# Patient Record
Sex: MALE | Race: WHITE
[De-identification: names, ages, dates, MRNs, and addresses within clinical notes are randomized per-mention and may not be internally consistent; named-entity substitution may affect disease eponyms.]

---

## 2017-11-28 ENCOUNTER — HOSPITAL ENCOUNTER (OUTPATIENT)
Dept: HOSPITAL 62 - RAD | Age: 52
End: 2017-11-28
Attending: INTERNAL MEDICINE
Payer: OTHER GOVERNMENT

## 2017-11-28 DIAGNOSIS — R31.29: ICD-10-CM

## 2017-11-28 DIAGNOSIS — I12.9: Primary | ICD-10-CM

## 2017-11-28 DIAGNOSIS — R80.9: ICD-10-CM

## 2017-11-28 DIAGNOSIS — D64.9: ICD-10-CM

## 2017-11-28 DIAGNOSIS — N18.3: ICD-10-CM

## 2017-11-28 PROCEDURE — 93976 VASCULAR STUDY: CPT

## 2017-11-28 PROCEDURE — 76770 US EXAM ABDO BACK WALL COMP: CPT

## 2017-11-28 NOTE — RADIOLOGY REPORT (SQ)
EXAM DESCRIPTION:  U/S RETROPERITON (RENAL/AORTA); U/S LTD DUPLEX ART/SANJANA FLOW



COMPLETED DATE/TIME:  11/28/2017 11:48 am



REASON FOR STUDY:  CKD STAGE 3 N18.3  CHRONIC KIDNEY DISEASE, STAGE 3 (MODERATE)



COMPARISON:  None.



TECHNIQUE:  Realtime and static grayscale images acquired. Selected color Doppler, velocities and spe
ctral images recorded.



LIMITATIONS:  Unable to visualize the renal artery origins directly off the aorta



FINDINGS:  RIGHT KIDNEY:

RENAL ARTERY VELOCITIES: At the hilum, 61 cm/sec.  Segmental artery velocity 32 cm/sec.

RENAL VEIN:  Color doppler flow present, patent.

VELOCITY RATIO: 0.76.  Normal waveforms.

KIDNEY:  Normal size, 10 cm in length   No significant pathology.

LEFT KIDNEY:

RENAL ARTERY VELOCITIES: At the hilum, 49 cm/sec.  Segmental artery velocity 33 cm/sec.

RENAL VEIN:  Color doppler flow present, patent.

VELOCITY RATIO: 0.6. Normal waveforms.

KIDNEY:  Normal size, 11 cm in length   No significant pathology.

BLADDER: Normal.

OTHER: No other significant finding.



IMPRESSION:  NO DOPPLER EVIDENCE OF HEMODYNAMICALLY SIGNIFICANT RENAL ARTERY STENOSIS.



COMMENT:  NORMAL RENAL ARTERY/AORTA VELOCITY RATIO IS LESS THAN OR EQUAL TO 3.5.



TECHNICAL DOCUMENTATION:  JOB ID:  [id number]

 2011 [employer]- All Rights Reserved

## 2017-11-28 NOTE — RADIOLOGY REPORT (SQ)
EXAM DESCRIPTION:  U/S RETROPERITON (RENAL/AORTA); U/S LTD DUPLEX ART/SANJANA FLOW



COMPLETED DATE/TIME:  11/28/2017 11:48 am



REASON FOR STUDY:  CKD STAGE 3 N18.3  CHRONIC KIDNEY DISEASE, STAGE 3 (MODERATE)



COMPARISON:  None.



TECHNIQUE:  Realtime and static grayscale images acquired. Selected color Doppler, velocities and spe
ctral images recorded.



LIMITATIONS:  Unable to visualize the renal artery origins directly off the aorta



FINDINGS:  RIGHT KIDNEY:

RENAL ARTERY VELOCITIES: At the hilum, 61 cm/sec.  Segmental artery velocity 32 cm/sec.

RENAL VEIN:  Color doppler flow present, patent.

VELOCITY RATIO: 0.76.  Normal waveforms.

KIDNEY:  Normal size, 10 cm in length   No significant pathology.

LEFT KIDNEY:

RENAL ARTERY VELOCITIES: At the hilum, 49 cm/sec.  Segmental artery velocity 33 cm/sec.

RENAL VEIN:  Color doppler flow present, patent.

VELOCITY RATIO: 0.6. Normal waveforms.

KIDNEY:  Normal size, 11 cm in length   No significant pathology.

BLADDER: Normal.

OTHER: No other significant finding.



IMPRESSION:  NO DOPPLER EVIDENCE OF HEMODYNAMICALLY SIGNIFICANT RENAL ARTERY STENOSIS.



COMMENT:  NORMAL RENAL ARTERY/AORTA VELOCITY RATIO IS LESS THAN OR EQUAL TO 3.5.



TECHNICAL DOCUMENTATION:  JOB ID:  [id number]

 2011 [employer]- All Rights Reserved

## 2017-12-13 ENCOUNTER — HOSPITAL ENCOUNTER (OUTPATIENT)
Dept: HOSPITAL 62 - RAD | Age: 52
Discharge: HOME | End: 2017-12-13
Attending: INTERNAL MEDICINE
Payer: OTHER GOVERNMENT

## 2017-12-13 VITALS — DIASTOLIC BLOOD PRESSURE: 51 MMHG | SYSTOLIC BLOOD PRESSURE: 93 MMHG

## 2017-12-13 DIAGNOSIS — D64.9: ICD-10-CM

## 2017-12-13 DIAGNOSIS — R31.29: ICD-10-CM

## 2017-12-13 DIAGNOSIS — R80.9: ICD-10-CM

## 2017-12-13 DIAGNOSIS — N18.3: ICD-10-CM

## 2017-12-13 DIAGNOSIS — I12.9: Primary | ICD-10-CM

## 2017-12-13 LAB
APTT BLD: 36.4 SEC (ref 23.5–35.8)
BUN SERPL-MCNC: 50 MG/DL (ref 7–20)
CREAT SERPL-MCNC: 1.93 MG/DL (ref 0.52–1.25)
ERYTHROCYTE [DISTWIDTH] IN BLOOD BY AUTOMATED COUNT: 14.2 % (ref 11.5–14)
HCT VFR BLD CALC: 34.3 % (ref 37.9–51)
HGB BLD-MCNC: 11.7 G/DL (ref 13.5–17)
HGB HCT DIFFERENCE: 0.8
MCH RBC QN AUTO: 30.1 PG (ref 27–33.4)
MCHC RBC AUTO-ENTMCNC: 34.1 G/DL (ref 32–36)
MCV RBC AUTO: 88 FL (ref 80–97)
PROTHROMBIN TIME: 13 SEC (ref 11.4–15.4)
RBC # BLD AUTO: 3.88 10^6/UL (ref 4.35–5.55)
WBC # BLD AUTO: 5.6 10^3/UL (ref 4–10.5)

## 2017-12-13 PROCEDURE — 85730 THROMBOPLASTIN TIME PARTIAL: CPT

## 2017-12-13 PROCEDURE — 82565 ASSAY OF CREATININE: CPT

## 2017-12-13 PROCEDURE — 85027 COMPLETE CBC AUTOMATED: CPT

## 2017-12-13 PROCEDURE — 85610 PROTHROMBIN TIME: CPT

## 2017-12-13 PROCEDURE — 88346 IMFLUOR 1ST 1ANTB STAIN PX: CPT

## 2017-12-13 PROCEDURE — 77012 CT SCAN FOR NEEDLE BIOPSY: CPT

## 2017-12-13 PROCEDURE — 84520 ASSAY OF UREA NITROGEN: CPT

## 2017-12-13 PROCEDURE — 0TB33ZX EXCISION OF RIGHT KIDNEY PELVIS, PERCUTANEOUS APPROACH, DIAGNOSTIC: ICD-10-PCS | Performed by: RADIOLOGY

## 2017-12-13 PROCEDURE — 88348 ELECTRON MICROSCOPY DX: CPT

## 2017-12-13 PROCEDURE — 88313 SPECIAL STAINS GROUP 2: CPT

## 2017-12-13 PROCEDURE — 36415 COLL VENOUS BLD VENIPUNCTURE: CPT

## 2017-12-13 PROCEDURE — 50200 RENAL BIOPSY PERQ: CPT

## 2017-12-13 NOTE — RADIOLOGY REPORT (SQ)
EXAM DESCRIPTION:  CT BIOPSY RENAL; CT NEEDLE PLACEMENT



COMPLETED DATE/TIME:  12/13/2017 9:21 am; 12/13/2017 9:20 am



REASON FOR STUDY:  PROTEINURUA; PROTEINURUA, RENAL BIOPSY N18.3  CHRONIC KIDNEY DISEASE, STAGE 3 (MOD
ERATE) R80.9  PROTEINURIA, UNSPECIFIED R31.29  OTHER MICROSCOPIC HEMATURIA



COMPARISON:  Renal artery Doppler and bilateral renal ultrasound 11/28/2017



RADIATION DOSE:  CT Rad equipment meets quality standard of care and radiation dose reduction techniq
ues were employed. CTDIvol: 4.0 - 20.2 mGy. DLP: 953 mGy-cm. mGy.



LIMITATIONS:  None.



PROCEDURE:  Procedure was discussed with the patient and the patient agreed to the procedure.

 Preliminary CT scanning to localize the biopsy site was performed. A site was marked on the right lo
wer pole kidney and time out was performed.  Procedure was performed using CT fluoroscopy. Total expo
sure time: 4.2 s.

IV sedation was administered and physician direction by the registered nurse using 1 milligrams of Ve
rsed and 75 micrograms of fentanyl. Physiologic monitoring was provided before, during, and after sed
ation. The total sedation time was 35 minutes.

Documentation face to face time, the performing proceduralist, spent monitoring the patient: 10minute
s.

After sterile skin prep with ChloraPrep, local lidocaine for skin and deep tissue anesthesia, the rig
ht lower pole kidney was localized. A coaxial 18 gauge needle was used to obtain 6 cores of tissue fr
om the right lower pole kidney.  The biopsy tract was embolized with Gelfoam.  Immediate post procedu
re CT demonstrates no significant post procedure hemorrhage

All CT scanners at this facility use dose modulation, iterative reconstruction, and/or weight based d
osing when appropriate to reduce radiation dose to as low as reasonably achievable (ALARA).

[facility]: Dose Right  [facility]: CareDose    [facility]: Dose Right    [facility]: Teradose 4D    [facility]: Smart Technologies



FINDINGS:  There were no immediate complications.  Specimen was carried to cytology on sterile saline
 gauze and submitted to the cytotechnologist for processing. Pathology is pending at the time of dict
ation.



IMPRESSION:  CT GUIDED RIGHT KIDNEY CORTICAL BIOPSY.

IV conscious sedation



COMMENT:  Pathology is pending

Patient medication list reviewed:Yes- Quality ID# 130:Eligible professional attests to documenting in
 the medical record they obtained, updated, or reviewed the patient's current medications..



TECHNICAL DOCUMENTATION:  JOB ID:  [id number]

Quality ID #145: Final reports for procedures using fluoroscopy that document radiation exposure will
ofelia, or exposure time and number of fluorographic images (if radiation exposure indices are not avail
able)

Quality ID # 436: Final reports with documentation of one or more dose reduction techniques (e.g., Au
tomated exposure control, adjustment of the mA and/or kV according to patient size, use of iterative 
reconstruction technique)

 2011 [employer]- All Rights Reserved

## 2017-12-13 NOTE — RADIOLOGY REPORT (SQ)
EXAM DESCRIPTION:  CT BIOPSY RENAL; CT NEEDLE PLACEMENT



COMPLETED DATE/TIME:  12/13/2017 9:21 am; 12/13/2017 9:20 am



REASON FOR STUDY:  PROTEINURUA; PROTEINURUA, RENAL BIOPSY N18.3  CHRONIC KIDNEY DISEASE, STAGE 3 (MOD
ERATE) R80.9  PROTEINURIA, UNSPECIFIED R31.29  OTHER MICROSCOPIC HEMATURIA



COMPARISON:  Renal artery Doppler and bilateral renal ultrasound 11/28/2017



RADIATION DOSE:  CT Rad equipment meets quality standard of care and radiation dose reduction techniq
ues were employed. CTDIvol: 4.0 - 20.2 mGy. DLP: 953 mGy-cm. mGy.



LIMITATIONS:  None.



PROCEDURE:  Procedure was discussed with the patient and the patient agreed to the procedure.

 Preliminary CT scanning to localize the biopsy site was performed. A site was marked on the right lo
wer pole kidney and time out was performed.  Procedure was performed using CT fluoroscopy. Total expo
sure time: 4.2 s.

IV sedation was administered and physician direction by the registered nurse using 1 milligrams of Ve
rsed and 75 micrograms of fentanyl. Physiologic monitoring was provided before, during, and after sed
ation. The total sedation time was 35 minutes.

Documentation face to face time, the performing proceduralist, spent monitoring the patient: 10minute
s.

After sterile skin prep with ChloraPrep, local lidocaine for skin and deep tissue anesthesia, the rig
ht lower pole kidney was localized. A coaxial 18 gauge needle was used to obtain 6 cores of tissue fr
om the right lower pole kidney.  The biopsy tract was embolized with Gelfoam.  Immediate post procedu
re CT demonstrates no significant post procedure hemorrhage

All CT scanners at this facility use dose modulation, iterative reconstruction, and/or weight based d
osing when appropriate to reduce radiation dose to as low as reasonably achievable (ALARA).

[facility]: Dose Right  [facility]: CareDose    [facility]: Dose Right    [facility]: Teradose 4D    [facility]: [employer]



FINDINGS:  There were no immediate complications.  Specimen was carried to cytology on sterile saline
 gauze and submitted to the cytotechnologist for processing. Pathology is pending at the time of dict
ation.



IMPRESSION:  CT GUIDED RIGHT KIDNEY CORTICAL BIOPSY.

IV conscious sedation



COMMENT:  Pathology is pending

Patient medication list reviewed:Yes- Quality ID# 130:Eligible professional attests to documenting in
 the medical record they obtained, updated, or reviewed the patient's current medications..



TECHNICAL DOCUMENTATION:  JOB ID:  [id number]

Quality ID #145: Final reports for procedures using fluoroscopy that document radiation exposure will
ofelia, or exposure time and number of fluorographic images (if radiation exposure indices are not avail
able)

Quality ID # 436: Final reports with documentation of one or more dose reduction techniques (e.g., Au
tomated exposure control, adjustment of the mA and/or kV according to patient size, use of iterative 
reconstruction technique)

 2011 [employer]- All Rights Reserved

## 2018-10-20 ENCOUNTER — HOSPITAL ENCOUNTER (OUTPATIENT)
Dept: HOSPITAL 62 - RAD | Age: 53
End: 2018-10-20
Attending: INTERNAL MEDICINE
Payer: COMMERCIAL

## 2018-10-20 DIAGNOSIS — R31.29: ICD-10-CM

## 2018-10-20 DIAGNOSIS — N02.8: ICD-10-CM

## 2018-10-20 DIAGNOSIS — R10.9: Primary | ICD-10-CM

## 2018-10-20 PROCEDURE — 74176 CT ABD & PELVIS W/O CONTRAST: CPT

## 2018-10-20 NOTE — RADIOLOGY REPORT (SQ)
EXAM DESCRIPTION:  CT ABD/PELVIS NO ORAL OR IV



COMPLETED DATE/TIME:  10/20/2018 9:14 am



REASON FOR STUDY:  BILATERAL FLANK PAIN, MICROHEMATURIA R10.9  UNSPECIFIED ABDOMINAL PAIN R31.29  OTH
ER MICROSCOPIC HEMATURIA N02.8  RECURRENT AND PERSISTENT HEMATURIA W OTH MORPHOLOGIC C



COMPARISON:  None.



TECHNIQUE:  CT scan of the abdomen and pelvis performed without intravenous or oral contrast. Images 
reviewed with lung, soft tissue, and bone windows. Reconstructed coronal and sagittal MPR images revi
ewed. All images stored on PACS.

All CT scanners at this facility use dose modulation, iterative reconstruction, and/or weight based d
osing when appropriate to reduce radiation dose to as low as reasonably achievable (ALARA).

[facility]: Dose Right  [facility]: CareDose    [facility]: Dose Right    [facility]: Teradose 4D    [facility]: [employer]



RADIATION DOSE:  CT Rad equipment meets quality standard of care and radiation dose reduction techniq
ues were employed. CTDIvol: 17.3 mGy. DLP: 1015 mGy-cm.mGy.



LIMITATIONS:  None.



FINDINGS:  LOWER CHEST: No significant findings. No nodules or infiltrates.

NON-CONTRASTED LIVER, SPLEEN, ADRENALS: Evaluation limited by lack of IV contrast. No identified sign
ificant masses.

PANCREAS: No masses. No peripancreatic inflammatory changes.

GALLBLADDER: No identified stones by CT criteria. No inflammatory changes to suggest cholecystitis.

RIGHT KIDNEY AND URETER: No suspicious masses. Assessment limited by lack of IV contrast.   No signif
icant calcifications.   No hydronephrosis or hydroureter.

LEFT KIDNEY AND URETER: No suspicious masses. Assessment limited by lack of IV contrast.   No signifi
cant calcifications.   No hydronephrosis or hydroureter.

AORTA AND RETROPERITONEUM: No aneurysm. No retroperitoneal masses or adenopathy.

BOWEL AND PERITONEAL CAVITY: No obvious masses or inflammatory changes. No free fluid.

APPENDIX: Normal.

PELVIS, BLADDER, AND ABDOMINAL WALL:No abnormal masses. No free fluid. Bladder normal.

BONES: No significant findings.

OTHER: No other significant finding.



IMPRESSION:  NO SIGNIFICANT OR ACUTE PROCESS IN THE ABDOMEN OR PELVIS.



COMMENT:  Quality ID # 436: Final reports with documentation of one or more dose reduction techniques
 (e.g., Automated exposure control, adjustment of the mA and/or kV according to patient size, use of 
iterative reconstruction technique)



TECHNICAL DOCUMENTATION:  JOB ID:  [id number]

 2011 [employer]- All Rights Reserved



Reading location - IP/workstation name: [email or web address]

## 2019-11-05 ENCOUNTER — HOSPITAL ENCOUNTER (OUTPATIENT)
Dept: HOSPITAL 62 - OD | Age: 54
End: 2019-11-05
Attending: INTERNAL MEDICINE
Payer: COMMERCIAL

## 2019-11-05 DIAGNOSIS — N18.3: ICD-10-CM

## 2019-11-05 DIAGNOSIS — R80.9: ICD-10-CM

## 2019-11-05 DIAGNOSIS — D63.1: ICD-10-CM

## 2019-11-05 DIAGNOSIS — E11.22: ICD-10-CM

## 2019-11-05 DIAGNOSIS — I12.9: Primary | ICD-10-CM

## 2019-11-05 LAB
ADD MANUAL DIFF: NO
ALBUMIN SERPL-MCNC: 4.4 G/DL (ref 3.5–5)
ANION GAP SERPL CALC-SCNC: 11 MMOL/L (ref 5–19)
BASOPHILS # BLD AUTO: 0 10^3/UL (ref 0–0.2)
BASOPHILS NFR BLD AUTO: 0.3 % (ref 0–2)
BUN SERPL-MCNC: 36 MG/DL (ref 7–20)
CALCIUM: 10.1 MG/DL (ref 8.4–10.2)
CHLORIDE SERPL-SCNC: 105 MMOL/L (ref 98–107)
CO2 SERPL-SCNC: 22 MMOL/L (ref 22–30)
EOSINOPHIL # BLD AUTO: 0.1 10^3/UL (ref 0–0.6)
EOSINOPHIL NFR BLD AUTO: 2.1 % (ref 0–6)
ERYTHROCYTE [DISTWIDTH] IN BLOOD BY AUTOMATED COUNT: 14.5 % (ref 11.5–14)
GLUCOSE SERPL-MCNC: 91 MG/DL (ref 75–110)
HCT VFR BLD CALC: 35.6 % (ref 37.9–51)
HGB BLD-MCNC: 12.1 G/DL (ref 13.5–17)
LYMPHOCYTES # BLD AUTO: 1.5 10^3/UL (ref 0.5–4.7)
LYMPHOCYTES NFR BLD AUTO: 24.5 % (ref 13–45)
MCH RBC QN AUTO: 29.9 PG (ref 27–33.4)
MCHC RBC AUTO-ENTMCNC: 34 G/DL (ref 32–36)
MCV RBC AUTO: 88 FL (ref 80–97)
MONOCYTES # BLD AUTO: 0.5 10^3/UL (ref 0.1–1.4)
MONOCYTES NFR BLD AUTO: 7.6 % (ref 3–13)
NEUTROPHILS # BLD AUTO: 4 10^3/UL (ref 1.7–8.2)
NEUTS SEG NFR BLD AUTO: 65.5 % (ref 42–78)
PHOSPHATE SERPL-MCNC: 4.1 MG/DL (ref 2.5–4.5)
PLATELET # BLD: 275 10^3/UL (ref 150–450)
POTASSIUM SERPL-SCNC: 4.9 MMOL/L (ref 3.6–5)
RBC # BLD AUTO: 4.04 10^6/UL (ref 4.35–5.55)
TOTAL CELLS COUNTED % (AUTO): 100 %
WBC # BLD AUTO: 6 10^3/UL (ref 4–10.5)

## 2019-11-05 PROCEDURE — 82570 ASSAY OF URINE CREATININE: CPT

## 2019-11-05 PROCEDURE — 82306 VITAMIN D 25 HYDROXY: CPT

## 2019-11-05 PROCEDURE — 36415 COLL VENOUS BLD VENIPUNCTURE: CPT

## 2019-11-05 PROCEDURE — 83970 ASSAY OF PARATHORMONE: CPT

## 2019-11-05 PROCEDURE — 82043 UR ALBUMIN QUANTITATIVE: CPT

## 2019-11-05 PROCEDURE — 85025 COMPLETE CBC W/AUTO DIFF WBC: CPT

## 2019-11-05 PROCEDURE — 80069 RENAL FUNCTION PANEL: CPT

## 2019-11-06 LAB
CREAT UR-MCNC: 83.2 MG/DL
MICROALBUMIN UR-MCNC: 100.6 UG/ML

## 2020-07-24 ENCOUNTER — HOSPITAL ENCOUNTER (OUTPATIENT)
Dept: HOSPITAL 62 - OD | Age: 55
End: 2020-07-24
Attending: INTERNAL MEDICINE
Payer: COMMERCIAL

## 2020-07-24 DIAGNOSIS — E87.5: ICD-10-CM

## 2020-07-24 DIAGNOSIS — N18.3: Primary | ICD-10-CM

## 2020-07-24 PROCEDURE — 84132 ASSAY OF SERUM POTASSIUM: CPT

## 2020-07-24 PROCEDURE — 36415 COLL VENOUS BLD VENIPUNCTURE: CPT
